# Patient Record
Sex: MALE | Race: WHITE | NOT HISPANIC OR LATINO | Employment: FULL TIME | URBAN - METROPOLITAN AREA
[De-identification: names, ages, dates, MRNs, and addresses within clinical notes are randomized per-mention and may not be internally consistent; named-entity substitution may affect disease eponyms.]

---

## 2018-11-29 ENCOUNTER — OFFICE VISIT (OUTPATIENT)
Dept: URGENT CARE | Facility: MEDICAL CENTER | Age: 28
End: 2018-11-29
Payer: COMMERCIAL

## 2018-11-29 VITALS
RESPIRATION RATE: 16 BRPM | HEART RATE: 83 BPM | HEIGHT: 66 IN | BODY MASS INDEX: 24.91 KG/M2 | WEIGHT: 155 LBS | SYSTOLIC BLOOD PRESSURE: 125 MMHG | DIASTOLIC BLOOD PRESSURE: 78 MMHG

## 2018-11-29 DIAGNOSIS — S63.502A WRIST SPRAIN, LEFT, INITIAL ENCOUNTER: Primary | ICD-10-CM

## 2018-11-29 PROCEDURE — 99203 OFFICE O/P NEW LOW 30 MIN: CPT | Performed by: FAMILY MEDICINE

## 2018-11-29 NOTE — PATIENT INSTRUCTIONS
1  ICE to area 10-15min 3-4x daily  2  Motrin as needed for comfort  3  Continue in wrist splint until pain free  4  Ortho   Consult if symptoms persist

## 2019-06-20 ENCOUNTER — OFFICE VISIT (OUTPATIENT)
Dept: URGENT CARE | Facility: MEDICAL CENTER | Age: 29
End: 2019-06-20
Payer: COMMERCIAL

## 2019-06-20 ENCOUNTER — APPOINTMENT (OUTPATIENT)
Dept: RADIOLOGY | Facility: MEDICAL CENTER | Age: 29
End: 2019-06-20
Attending: PHYSICIAN ASSISTANT
Payer: COMMERCIAL

## 2019-06-20 VITALS
TEMPERATURE: 100 F | WEIGHT: 160 LBS | DIASTOLIC BLOOD PRESSURE: 84 MMHG | BODY MASS INDEX: 25.71 KG/M2 | HEART RATE: 98 BPM | HEIGHT: 66 IN | OXYGEN SATURATION: 98 % | RESPIRATION RATE: 18 BRPM | SYSTOLIC BLOOD PRESSURE: 151 MMHG

## 2019-06-20 DIAGNOSIS — R05.8 PRODUCTIVE COUGH: ICD-10-CM

## 2019-06-20 DIAGNOSIS — J06.9 UPPER RESPIRATORY TRACT INFECTION, UNSPECIFIED TYPE: Primary | ICD-10-CM

## 2019-06-20 PROCEDURE — 71046 X-RAY EXAM CHEST 2 VIEWS: CPT

## 2019-06-20 PROCEDURE — 99213 OFFICE O/P EST LOW 20 MIN: CPT | Performed by: PHYSICIAN ASSISTANT

## 2019-06-20 RX ORDER — BENZONATATE 200 MG/1
200 CAPSULE ORAL 3 TIMES DAILY PRN
Qty: 20 CAPSULE | Refills: 0 | Status: SHIPPED | OUTPATIENT
Start: 2019-06-20